# Patient Record
Sex: FEMALE | Race: WHITE
[De-identification: names, ages, dates, MRNs, and addresses within clinical notes are randomized per-mention and may not be internally consistent; named-entity substitution may affect disease eponyms.]

---

## 2020-08-24 ENCOUNTER — HOSPITAL ENCOUNTER (OUTPATIENT)
Dept: HOSPITAL 84 - D.HCCECHO | Age: 20
Discharge: HOME | End: 2020-08-24
Attending: INTERNAL MEDICINE
Payer: COMMERCIAL

## 2020-08-24 DIAGNOSIS — R00.2: Primary | ICD-10-CM

## 2020-08-26 NOTE — EC
PATIENT:MISBAH LEE                    DATE OF SERVICE: 08/24/20
SEX: F                                  MEDICAL RECORD: [id number]
DATE OF BIRTH: [birth year]                        LOCATION:D[facility]          
AGE OF PATIENT: 20                             ADMISSION DATE: 08/24/20
 
REFERRING PHYSICIAN:                               
 
INTERPRETING PHYSICIAN: RIGO CASE MD          
 
 
 
                             ECHOCARDIOGRAM REPORT
  ECHO CHARGES 4               ECHO COMPLETE                 Date: 08/24/20
 
 
 
CLINICAL DIAGNOSIS: HEART MURMUR/PALPITATIONS     
 
                         ECHOCARDIOGRAPHIC MEASUREMENTS
      (adult normal given)
   AC root (d.<3.7cm) 3.2  cm   LV Septum d (<1.2 cm> 1.2  cm
      Valve Excursion 1.9  cm     LV Septum (systole) 1.3  cm
Left Atria (s.<4.0cm> 4.1  cm          LVPW d(<1.2cm) 1.1  cm
        RV (d.<2.3cm) 2.8  cm           LVPW (sytole) 1.4  cm
  LV diastole(<5.6CM) 4.8  cm       MV E-F(>70mm/sec)      cm
           LV systole 3.5  cm           LVOT Diameter 1.9  cm
       MV exc.(>10mm) 1.9  cm
Est.ejection fraction (50-75%)     %
 
   DOPPLER:
     LVIT      cm/sec A 43.0 cm/sec E 68.0  cm/sec
       LA      cm/sec      RVSP 31   mmHg
     LVOT 94   cm/sec   AOP1/2T      m/s
  Asc. Ao 132  cm/sec
     RVOT 59   cm/sec
       RA      cm/sec
       PA 95   cm/sec
 AV Gradient Peak 6.95 mmHg  AV Mean 3.48 mmHg  AV Area 2.2  cm
 MV Gradient Peak 3.80 mmHg  MV Mean 1.34 mmHg  MV Area      cm
   COMMENTS:                                              
 
 
 Cardiac Sonographer: 2               VIVEK LYMAN              
      Cardiologist: 3          Dr. Vega             
             TAPE# PACS           
                                       Pericardial Effusion N                        
 
 
DATE OF SERVICE:  
 
PROCEDURE:  Treadmill stress test.
Baseline ECG shows frequent PVCs including couplets and a right bundle branch
pattern.  Exercised for 10 minutes on Yasmany protocol with suppression of some of
the PVCs with exercise.
No ECG changes for ischemia.  Normal blood pressure response to exercise.  No
anginal symptomology.
 
TRANSINT:[id number] Voice Confirmation ID: [id number] DOCUMENT ID: [id number]
 
 
 
ECHOCARDIOGRAM REPORT                          [id number]    JESUSMISBAH            
 
 
                                           
                                           RIGO CASE MD          
 
 
 
Electronically Signed by RIGO CASE on 08/26/20 at 0818
 
 
 
 
 
 
 
 
 
 
 
 
 
 
 
 
 
 
 
 
 
 
 
 
 
 
 
 
 
 
 
 
 
 
 
 
 
 
 
 
CC:                                                             [id number]
DICTATION DATE: 08/25/20 1255     :     08/25/20 2040      DEP CLI 
                                                                      08/24/20
[id number] [place] [address]

## 2020-08-26 NOTE — EC
PATIENT:MISBAH LEE                    DATE OF SERVICE: 08/24/20
SEX: F                                  MEDICAL RECORD: [id number]
DATE OF BIRTH: [birth year]                        LOCATION:D.[facility]          
AGE OF PATIENT: 20                             ADMISSION DATE: 08/24/20
 
REFERRING PHYSICIAN:                               
 
INTERPRETING PHYSICIAN: RIGO CASE MD          
 
 
 
                             ECHOCARDIOGRAM REPORT
  ECHO CHARGES 4               ECHO COMPLETE                 Date: 08/24/20
 
 
 
CLINICAL DIAGNOSIS: HEART MURMUR/PALPITATIONS     
 
                         ECHOCARDIOGRAPHIC MEASUREMENTS
      (adult normal given)
   AC root (d.<3.7cm) 3.2  cm   LV Septum d (<1.2 cm> 1.2  cm
      Valve Excursion 1.9  cm     LV Septum (systole) 1.3  cm
Left Atria (s.<4.0cm> 4.1  cm          LVPW d(<1.2cm) 1.1  cm
        RV (d.<2.3cm) 2.8  cm           LVPW (sytole) 1.4  cm
  LV diastole(<5.6CM) 4.8  cm       MV E-F(>70mm/sec)      cm
           LV systole 3.5  cm           LVOT Diameter 1.9  cm
       MV exc.(>10mm) 1.9  cm
Est.ejection fraction (50-75%)     %
 
   DOPPLER:
     LVIT      cm/sec A 43.0 cm/sec E 68.0  cm/sec
       LA      cm/sec      RVSP 31   mmHg
     LVOT 94   cm/sec   AOP1/2T      m/s
  Asc. Ao 132  cm/sec
     RVOT 59   cm/sec
       RA      cm/sec
       PA 95   cm/sec
 AV Gradient Peak 6.95 mmHg  AV Mean 3.48 mmHg  AV Area 2.2  cm
 MV Gradient Peak 3.80 mmHg  MV Mean 1.34 mmHg  MV Area      cm
   COMMENTS:                                              
 
 
 Cardiac Sonographer: 2               VIVEK LYMAN              
      Cardiologist: 3          Dr. Vega             
             TAPE# PACS           
                                       Pericardial Effusion N                        
 
 
DATE OF SERVICE:  
 
Adequate 2D echo, color flow, spectral Doppler, and M-Mode.
 
No LVH.  LV internal dimension is normal.  Wall motion is normal.  EF is greater
than or equal to 55%.  Aortic valve is tricuspid.  No evidence of stenosis by
Doppler interrogation.  Left atrium is normal.  Mitral valve shows no prolapse. 
Trace MR.  Right-sided chambers are grossly normal.  Mild TR.
 
TRANSINT:[id number] Voice Confirmation ID: [id number] DOCUMENT ID: [id number]
 
 
 
ECHOCARDIOGRAM REPORT                          [id number]    MISBAH LEE,RIGO MARROQUIN MD          
 
 
 
Electronically Signed by RIGO CASE on 08/26/20 at 0818
 
 
 
 
 
 
 
 
 
 
 
 
 
 
 
 
 
 
 
 
 
 
 
 
 
 
 
 
 
 
 
 
 
 
 
 
 
 
 
 
CC:                                                             [id number]
DICTATION DATE: 08/25/20 1249     :     08/25/20 2028      DEP CLI 
                                                                      08/24/20
[facility]                                          
[address] [place] [address]